# Patient Record
Sex: FEMALE | Race: WHITE | NOT HISPANIC OR LATINO | Employment: UNEMPLOYED | URBAN - NONMETROPOLITAN AREA
[De-identification: names, ages, dates, MRNs, and addresses within clinical notes are randomized per-mention and may not be internally consistent; named-entity substitution may affect disease eponyms.]

---

## 2023-03-03 PROBLEM — K80.51 BILE DUCT STONE WITH OBSTRUCT: Status: ACTIVE | Noted: 2023-03-03

## 2023-03-03 PROBLEM — D64.9 ANEMIA: Status: ACTIVE | Noted: 2023-03-03

## 2023-03-03 PROBLEM — I10 BENIGN ESSENTIAL HYPERTENSION: Status: ACTIVE | Noted: 2023-03-03

## 2023-03-03 PROBLEM — O99.810 ABNORMAL GLUCOSE AFFECTING PREGNANCY (HHS-HCC): Status: ACTIVE | Noted: 2023-03-03

## 2023-03-03 PROBLEM — M72.2 PLANTAR FASCIITIS: Status: ACTIVE | Noted: 2023-03-03

## 2023-03-03 PROBLEM — L08.9 WOUND INFECTION: Status: ACTIVE | Noted: 2023-03-03

## 2023-03-03 PROBLEM — T14.8XXA WOUND INFECTION: Status: ACTIVE | Noted: 2023-03-03

## 2023-03-03 RX ORDER — LISINOPRIL AND HYDROCHLOROTHIAZIDE 10; 12.5 MG/1; MG/1
1 TABLET ORAL DAILY
COMMUNITY
Start: 2020-08-12 | End: 2023-03-14 | Stop reason: ALTCHOICE

## 2023-03-03 RX ORDER — LEVONORGESTREL 52 MG/1
INTRAUTERINE DEVICE INTRAUTERINE
COMMUNITY
End: 2024-05-08 | Stop reason: ALTCHOICE

## 2023-03-14 ENCOUNTER — OFFICE VISIT (OUTPATIENT)
Dept: PRIMARY CARE | Facility: CLINIC | Age: 43
End: 2023-03-14
Payer: COMMERCIAL

## 2023-03-14 VITALS
OXYGEN SATURATION: 98 % | DIASTOLIC BLOOD PRESSURE: 72 MMHG | BODY MASS INDEX: 39.71 KG/M2 | SYSTOLIC BLOOD PRESSURE: 140 MMHG | WEIGHT: 253 LBS | HEIGHT: 67 IN | HEART RATE: 86 BPM

## 2023-03-14 DIAGNOSIS — I10 BENIGN ESSENTIAL HYPERTENSION: Primary | ICD-10-CM

## 2023-03-14 PROBLEM — O99.810 ABNORMAL GLUCOSE AFFECTING PREGNANCY (HHS-HCC): Status: RESOLVED | Noted: 2023-03-03 | Resolved: 2023-03-14

## 2023-03-14 PROBLEM — T14.8XXA WOUND INFECTION: Status: RESOLVED | Noted: 2023-03-03 | Resolved: 2023-03-14

## 2023-03-14 PROBLEM — D64.9 ANEMIA: Status: RESOLVED | Noted: 2023-03-03 | Resolved: 2023-03-14

## 2023-03-14 PROBLEM — M72.2 PLANTAR FASCIITIS: Status: RESOLVED | Noted: 2023-03-03 | Resolved: 2023-03-14

## 2023-03-14 PROBLEM — K80.51 BILE DUCT STONE WITH OBSTRUCT: Status: RESOLVED | Noted: 2023-03-03 | Resolved: 2023-03-14

## 2023-03-14 PROBLEM — L08.9 WOUND INFECTION: Status: RESOLVED | Noted: 2023-03-03 | Resolved: 2023-03-14

## 2023-03-14 PROCEDURE — 3078F DIAST BP <80 MM HG: CPT | Performed by: FAMILY MEDICINE

## 2023-03-14 PROCEDURE — 3077F SYST BP >= 140 MM HG: CPT | Performed by: FAMILY MEDICINE

## 2023-03-14 PROCEDURE — 99213 OFFICE O/P EST LOW 20 MIN: CPT | Performed by: FAMILY MEDICINE

## 2023-03-14 PROCEDURE — 1036F TOBACCO NON-USER: CPT | Performed by: FAMILY MEDICINE

## 2023-03-14 RX ORDER — LABETALOL 200 MG/1
200 TABLET, FILM COATED ORAL 2 TIMES DAILY
Qty: 60 TABLET | Refills: 11 | Status: SHIPPED | OUTPATIENT
Start: 2023-03-14 | End: 2024-03-27

## 2023-03-14 ASSESSMENT — PATIENT HEALTH QUESTIONNAIRE - PHQ9
SUM OF ALL RESPONSES TO PHQ9 QUESTIONS 1 AND 2: 0
1. LITTLE INTEREST OR PLEASURE IN DOING THINGS: NOT AT ALL
2. FEELING DOWN, DEPRESSED OR HOPELESS: NOT AT ALL

## 2023-03-14 ASSESSMENT — ENCOUNTER SYMPTOMS: HYPERTENSION: 1

## 2023-03-14 NOTE — PROGRESS NOTES
"Subjective   Patient ID: Kaila Richardson is a 43 y.o. female who presents for Hypertension.    Hypertension     yrly check up  Sl cough at times      Review of Systems   All other systems reviewed and are negative.  Patient is going to have IUD removed  She may become pregnant  Discussed the need to discontinue lisinopril then switch to other medication  Patient otherwise feeling well  No chest pain pressure palpitation      Objective   /72   Pulse 86   Ht 1.689 m (5' 6.5\")   Wt 115 kg (253 lb)   SpO2 98%   BMI 40.22 kg/m²     Physical Exam  General: alert, no apparent distress, good hygiene   HEAD:  Normocephalic, atraumatic    EARS:  EAC patent, TMs normal,   EYES:  sclera white, SHILA, conjunctiva noninjected  NOSE: Nasal passages patent   MOUTH: Pharynx clear, tongue uvula midline, grade 2 airway  Neck:  supple, no masses, thyroid non enlarged non nodular, no cervical adenopathy,  Lungs:  no wheezing, no rales , no rhonchi, normal respiratory pattern, breath sounds not diminished  Heart:  regular rate and  rhythm, no murmur, no ectopy, no S3 or S4, no carotid bruits  Abdomen:  soft NT,BS + ,  no organomegaly, no masses, no bruits  Extremities:  no edema, no cyanosis, no clubbing,  2+ posterior tibialis pulse    Psych:  speech fluent, normal affect, normal thought process  Skin:  no rashes, no concerning skin lesions, normal texture  Neuro:  normal gait     Assessment/Plan   Problem List Items Addressed This Visit       Benign essential hypertension - Primary   Patient will discontinue lisinopril and start on labetalol  She has check blood pressure at home she understands goal  Discussed the need to see OB early if she becomes pregnant and monitor pressure throughout pregnancy  -Diet weight loss       "

## 2023-03-14 NOTE — PATIENT INSTRUCTIONS
"DASH stands for Dietary Approaches to Stop Hypertension.   It is a balanced eating plan that your family doctor might recommend to help you lower your blood pressure. The DASH diet:Is low in salt, saturated fat, cholesterol and total fat.Emphasizes fruits, vegetables, and fat-free or low-fat milk and milk products.Includes whole grains, fish, poultry and nuts.Limits the amount of red meat, sweets, added sugars and sugar-containing beverages in your diet.Is rich in potassium, magnesium and calcium, as well as protein and fiber.Getting too much sodium (salt) in your diet can contribute to high blood pressure (also called hypertension). Some salt is in foods naturally, and some salt is added to food when it is processed or prepared. Following the DASH diet can help you lower your blood pressure, or prevent high blood pressure, by reducing the amount of sodium in your diet to less than 2,300 mg per day.The fruits, vegetables and whole grains recommended in the DASH diet provide many other elements of a healthy diet, such as lycopene, beta-carotene and isoflavones. These can help protect your body against common health conditions, such as cancer, osteoporosis, stroke and diabetes. Following the DASH diet can also help reduce your risk of heart disease by lowering your low-density lipoprotein (LDL, or \"bad\") cholesterol level.Following the DASH diet may drop your blood pressure by a few points in as little as 2 weeks. However, you should not stop taking your blood pressure medicine, or any other prescribed medicine, without talking to your doctor first.  The DASH diet  Whole grains (6 to 8 servings a day)Vegetables (4 to 5 servings a day)Fruits (4 to 5 servings a day)Low-fat or fat-free milk and milk products (2 to 3 servings a day)Lean meats, poultry and fish (6 or fewer servings a day)Nuts, seeds and beans (4 to 5 servings a week)Fats and oils (2 to 3 servings a day)Sweets, preferably low-fat or fat-free (5 or fewer a " week)Sodium (no more than 2,300 mg a day)You can adapt the DASH diet to meet your needs. For example:If you drink alcohol, limit yourself to 2 drinks or less per day for men and 1 drink or less per day for women.To reduce your blood pressure even more, replace some of the carbohydrates in the DASH diet with low-fat protein and unsaturated fats.If you need to lose weight, reduce the number of calories you eat to about 1,600 per day.Follow a lower-sodium version (no more than 1,500 mg daily) if you are 40 years of age or older, you are  or you already have high blood pressure.  How can I change my eating habits?  Don't be discouraged if following the DASH diet is difficult at first. Start with small, achievable goals. The following ideas can help you make healthy changes:Pay attention to  your current eating habits. Make a list of everything you eat for 2 or 3 days. Compare this list to the DASH diet recommendations above and see what changes you need to make in your diet.Make one change at a time. For example, start by choosing lower-fat versions of your favorite foods or adding more whole grains to your meals.Learn what makes a serving for each type of food. For example, 1 serving equals 1 slice of bread, 8 ounces of milk, 1 cup of raw vegetables or 1/2 cup of cooked vegetables. For more serving sizes, go to the [facility] site. Don't have a measuring cup? One serving (3 ounces) of meat or poultry is about the size of a deck of cards. One serving (1/2 cup) of rice or potato looks like half a baseball, and a serving of cheese is about the size of 4 stacked dice.If eating more fruits and vegetables gives you gas, bloating or diarrhea, increase these foods slowly. You can also talk to your family doctor about taking over-the-counter medicines to reduce these symptoms until your body adjusts.Get more exercise. Physical activity helps lower your blood pressure and can help you lose more weight.Use salt-free  "seasonings, such as spices and herbs, to add flavor to your recipes and reduce or eliminate salt.Include as many fresh and unprocessed foods as possible. Cut back on frozen dinners, packaged mixes, canned soups and bottled salad dressings, which are often high in sodium.When buying canned, frozen or processed foods, check nutrition labels for the amount of sodium, sugar and saturated fat. Look for the phrases \"no salt added,\" \"sodium-free,\" \"low sodium\" or \"very low sodium\" on food packages. Choose foods with monounsaturated and polyunsaturated fats.Steam, grill, poach, roast or stir-springer foods. Use low-sodium broth or water instead of butter or oil for sauting.When you eat at a restaurant, ask how foods are prepared. Ask if your order can be made without added salt. Don't add salty condiments, such as ketchup, mustard, pickles or sauces, to your food.    Copied from [email or web address] [email or web address]   "

## 2024-03-27 DIAGNOSIS — I10 BENIGN ESSENTIAL HYPERTENSION: ICD-10-CM

## 2024-03-27 RX ORDER — LABETALOL 200 MG/1
1 TABLET, FILM COATED ORAL 2 TIMES DAILY
Qty: 60 TABLET | Refills: 0 | Status: SHIPPED | OUTPATIENT
Start: 2024-03-27 | End: 2024-05-08 | Stop reason: SDUPTHER

## 2024-05-01 ENCOUNTER — APPOINTMENT (OUTPATIENT)
Dept: PRIMARY CARE | Facility: CLINIC | Age: 44
End: 2024-05-01
Payer: COMMERCIAL

## 2024-05-08 ENCOUNTER — OFFICE VISIT (OUTPATIENT)
Dept: PRIMARY CARE | Facility: CLINIC | Age: 44
End: 2024-05-08
Payer: COMMERCIAL

## 2024-05-08 VITALS
BODY MASS INDEX: 43.07 KG/M2 | DIASTOLIC BLOOD PRESSURE: 84 MMHG | HEART RATE: 72 BPM | OXYGEN SATURATION: 98 % | HEIGHT: 66 IN | SYSTOLIC BLOOD PRESSURE: 132 MMHG | WEIGHT: 268 LBS

## 2024-05-08 DIAGNOSIS — R19.7 DIARRHEA, UNSPECIFIED TYPE: ICD-10-CM

## 2024-05-08 DIAGNOSIS — I10 BENIGN ESSENTIAL HYPERTENSION: Primary | ICD-10-CM

## 2024-05-08 PROCEDURE — 3075F SYST BP GE 130 - 139MM HG: CPT

## 2024-05-08 PROCEDURE — 3079F DIAST BP 80-89 MM HG: CPT

## 2024-05-08 PROCEDURE — 1036F TOBACCO NON-USER: CPT

## 2024-05-08 PROCEDURE — 99213 OFFICE O/P EST LOW 20 MIN: CPT

## 2024-05-08 RX ORDER — LABETALOL 200 MG/1
1 TABLET, FILM COATED ORAL 2 TIMES DAILY
Qty: 180 TABLET | Refills: 3 | Status: SHIPPED | OUTPATIENT
Start: 2024-05-08

## 2024-05-08 RX ORDER — MONTELUKAST SODIUM 4 MG/1
1 TABLET, CHEWABLE ORAL
Qty: 60 TABLET | Refills: 11 | Status: SHIPPED | OUTPATIENT
Start: 2024-05-08 | End: 2025-05-08

## 2024-05-08 ASSESSMENT — PATIENT HEALTH QUESTIONNAIRE - PHQ9
2. FEELING DOWN, DEPRESSED OR HOPELESS: NOT AT ALL
1. LITTLE INTEREST OR PLEASURE IN DOING THINGS: NOT AT ALL
SUM OF ALL RESPONSES TO PHQ9 QUESTIONS 1 AND 2: 0

## 2024-05-08 NOTE — PATIENT INSTRUCTIONS
I will get back to you today about the medication for diarrhea    Call the [facility] to schedule echo for the shortness of breath

## 2024-05-08 NOTE — PROGRESS NOTES
Subjective   Patient ID: Kaila Richardson is a 44 y.o. female who presents for Hypertension (diarrhea).  HPI  Kaila presents for check up   HTN  -labetalol makes her more tired  -still taking it   -does check at home - usually sees 140s, last night it was 120s/80s  -no lightheadedness, no dizziness  -SOB at times, has noticed it during the night laying down  -Congestion is somewhat worse     Diarrhea   -Has had it for a couple years  -Had cholecystectomy   -Mostly every day  -Mostly in the morning, sometimes 3 episodes or so   -Some bloating  -No pain   -Some increase in passing gas     Health Maintenance Reminder:  -Blood Work: last lab , declined today   -Hep C: declined   -Colonoscopy: declined   -Mammo: declined  -Dexa >65y:  -Pap: , Dr. Covington   -Lung CA Screen: 50-80  -Shingrix >50:  -Pneumovax >65y, <65 if at risk, 5y between <65 and >65 dose:  -Flu: Yearly  -tdap: '    Past Surgical History:   Procedure Laterality Date    CHOLECYSTECTOMY  10/19/2015    Cholecystectomy Laparoscopic    OTHER SURGICAL HISTORY  2014    ERCP With Removal Of Stones From Biliary/Pancreatic Ducts    TYMPANOSTOMY TUBE PLACEMENT  10/09/2014    Ear Pressure Equalization Tube, Insertion, Bilaterally      Past Medical History:   Diagnosis Date    Bile duct stone with obstruct 2023    Encounter for full-term uncomplicated delivery ([facility])      (spontaneous vaginal delivery)    Gestational (pregnancy-induced) hypertension without significant proteinuria, unspecified trimester ([facility])     Gestational HTN    Other specified health status     Rubella immune    Personal history of other diseases of the circulatory system     History of cardiomyopathy    Personal history of other diseases of the female genital tract     History of polycystic ovarian syndrome    Personal history of other infectious and parasitic diseases     History of varicella     Social History     Tobacco Use    Smoking status: Former     Types:  "Cigarettes    Smokeless tobacco: Never        Review of Systems  10 point review of systems performed and is negative except as noted in the HPI.      Current Outpatient Medications:     mv-min/folic/vit K/lycop/coQ10 (DAILY MULTIVITAMIN ORAL), Take 1 tablet by mouth once daily., Disp: , Rfl:     colestipol (Colestid) 1 gram tablet, Take 1 tablet (1 g) by mouth 2 times a day after meals. Take at least 1 hour after or 4 hours before other medications., Disp: 60 tablet, Rfl: 11    labetalol (Normodyne) 200 mg tablet, Take 1 tablet (200 mg) by mouth 2 times a day., Disp: 180 tablet, Rfl: 3     Objective   /84   Pulse 72   Ht 1.676 m (5' 6\")   Wt 122 kg (268 lb)   SpO2 98%   BMI 43.26 kg/m²     Physical Exam  Constitutional:       General: She is not in acute distress.     Appearance: Normal appearance. She is not ill-appearing or toxic-appearing.   HENT:      Head: Normocephalic and atraumatic.      Right Ear: Tympanic membrane, ear canal and external ear normal.      Left Ear: Tympanic membrane, ear canal and external ear normal.      Nose: Nose normal. No congestion or rhinorrhea.      Mouth/Throat:      Mouth: Mucous membranes are moist.      Pharynx: Oropharynx is clear. No oropharyngeal exudate or posterior oropharyngeal erythema.   Eyes:      Conjunctiva/sclera: Conjunctivae normal.      Pupils: Pupils are equal, round, and reactive to light.   Neck:      Vascular: No carotid bruit.   Cardiovascular:      Rate and Rhythm: Normal rate and regular rhythm.      Pulses: Normal pulses.      Heart sounds: Normal heart sounds. No murmur heard.  Pulmonary:      Effort: Pulmonary effort is normal.      Breath sounds: Normal breath sounds. No wheezing, rhonchi or rales.   Abdominal:      General: Bowel sounds are normal. There is no distension.      Palpations: Abdomen is soft. There is no mass.      Tenderness: There is generalized abdominal tenderness. There is no guarding or rebound.   Musculoskeletal:        "  General: Normal range of motion.      Cervical back: Normal range of motion and neck supple. No tenderness.      Right lower leg: No edema.      Left lower leg: No edema.   Lymphadenopathy:      Cervical: No cervical adenopathy.   Skin:     General: Skin is warm and dry.   Neurological:      Mental Status: She is alert and oriented to person, place, and time.   Psychiatric:         Mood and Affect: Mood normal.         Behavior: Behavior normal.         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 Patient (and/or caretaker of patient if present) stated all questions were answered, and they voiced understanding of instructions.

## 2024-05-09 PROBLEM — Z86.79 HISTORY OF CARDIOMYOPATHY: Status: ACTIVE | Noted: 2024-05-09

## 2025-05-07 ENCOUNTER — APPOINTMENT (OUTPATIENT)
Dept: PRIMARY CARE | Facility: CLINIC | Age: 45
End: 2025-05-07
Payer: COMMERCIAL

## 2025-05-07 VITALS
SYSTOLIC BLOOD PRESSURE: 130 MMHG | WEIGHT: 263 LBS | OXYGEN SATURATION: 100 % | HEART RATE: 76 BPM | DIASTOLIC BLOOD PRESSURE: 82 MMHG | BODY MASS INDEX: 43.82 KG/M2 | HEIGHT: 65 IN

## 2025-05-07 DIAGNOSIS — I10 BENIGN ESSENTIAL HYPERTENSION: Primary | ICD-10-CM

## 2025-05-07 PROCEDURE — 1036F TOBACCO NON-USER: CPT

## 2025-05-07 PROCEDURE — 3075F SYST BP GE 130 - 139MM HG: CPT

## 2025-05-07 PROCEDURE — 3008F BODY MASS INDEX DOCD: CPT

## 2025-05-07 PROCEDURE — 99213 OFFICE O/P EST LOW 20 MIN: CPT

## 2025-05-07 PROCEDURE — 3079F DIAST BP 80-89 MM HG: CPT

## 2025-05-07 RX ORDER — LISINOPRIL AND HYDROCHLOROTHIAZIDE 10; 12.5 MG/1; MG/1
1 TABLET ORAL DAILY
Qty: 90 TABLET | Refills: 3 | Status: SHIPPED | OUTPATIENT
Start: 2025-05-07 | End: 2026-05-02

## 2025-05-07 NOTE — PATIENT INSTRUCTIONS
Start lisinopril hydrochlorothiazide once a day for blood pressure   Stop labetalol  1 month follow up in person or phone     [facility] 's   (Breast Cancer and Cervical Cancer Prevention )  phone number  is [phone].     This is a program who will cover the costs of breast and pelvic exams, pap smears, and mammograms, as well as the follow up to any abnormalities found with those tests.      They  help women who are uninsured or under insured.     If you haven't yet,  please give them a call to see if you qualify for the program.    If you do,  they will have paperwork for you to fill out and send back.     If you have qualified for the program,  they will have to set up all appointments for you.     If you are [place], the [facility] ( an [facility] center) sponsors periodic clinics with [facility].  You can ask the [facility] people if you could have an appointment there  OR  in the office with me.       If you are non-[place], your visit will only be here in the office.      If you already have your order for a mammogram,  be sure to contact them to set up the mammogram appointment.     Be sure to take your mammogram order with you to your appointment, along with any [facility] paperwork you may have.     If you have already had your testing, you will always hear about your results.  So if 3 weeks go by, and you have not heard anything, please let either myself or the people at [facility] know.     And,  please tell any of your friends who may not have insurance about this fantastic program!

## 2025-05-07 NOTE — ASSESSMENT & PLAN NOTE
Stop labetalol, she has been on this due to concern of possible pregnancy, she states at this point she is not planning pregnancy  She would like to go back on lisinopril hydrochlorothiazide, will restart   Discussed 1 month follow up to see how BP is, check BP   Discussed if she were to become pregnant then she needs to stop lisinopril hydrochlorothiazide immediately, discussed the risks   Orders:    lisinopriL-hydrochlorothiazide 10-12.5 mg tablet; Take 1 tablet by mouth once daily.

## 2025-05-07 NOTE — PROGRESS NOTES
"Subjective   Patient ID: Kaila Richardson is a 45 y.o. female who presents for Hypertension.  HPI  Kaila presents for check up   Last visit was 5/8/24    HTN   -still tired on labetalol  -it is controlling BP  -states she is not planning to be pregnant, that is why we have had her on labetalol due to concern of pregnancy    Diarrhea   -colestipol helped   -stopped it due to not wanting to take another medication  -diarrhea is not bloody or different   -diarrhea daily     She is a carrier for cardiomyopathy gene - has had echo before she thinks 2008, has not followed up with [facility]    Sleep- is good     Health Maintenance Reminder:  -Blood Work: last lab 2018, declined today due to cost   -Hep C: declined   -Colonoscopy: declined   -Mammo: [facility] info   -Dexa >65y:  -Pap: 2023 wnl neg co-testing due again 2028, Dr. Covington   -Lung CA Screen: 50-80  -Shingrix >50:  -Pneumovax >65y, <65 if at risk, 5y between <65 and >65 dose:  -Flu: Yearly  -tdap:     Surgical History[1]   Medical History[2]  Social History[3]     Review of Systems  10 point review of systems performed and is negative except as noted in the HPI.    Current Medications[4]     Objective   /82   Pulse 76   Ht 1.657 m (5' 5.25\")   Wt 119 kg (263 lb)   SpO2 100%   BMI 43.43 kg/m²     Physical Exam  Constitutional:       Appearance: Normal appearance.   HENT:      Head: Normocephalic and atraumatic.      Right Ear: Tympanic membrane, ear canal and external ear normal.      Left Ear: Tympanic membrane, ear canal and external ear normal.      Nose: Nose normal.      Mouth/Throat:      Mouth: Mucous membranes are moist.      Pharynx: Oropharynx is clear.   Eyes:      Conjunctiva/sclera: Conjunctivae normal.      Pupils: Pupils are equal, round, and reactive to light.   Cardiovascular:      Rate and Rhythm: Normal rate and regular rhythm.      Pulses: Normal pulses.      Heart sounds: Normal heart sounds. No murmur heard.  Pulmonary:      Effort: Pulmonary " effort is normal.      Breath sounds: Normal breath sounds. No wheezing, rhonchi or rales.   Abdominal:      General: Bowel sounds are normal.      Palpations: Abdomen is soft.      Tenderness: There is no abdominal tenderness.   Musculoskeletal:         General: Normal range of motion.      Right lower leg: No edema.      Left lower leg: No edema.   Skin:     General: Skin is warm and dry.   Neurological:      Mental Status: She is alert and oriented to person, place, and time.   Psychiatric:         Mood and Affect: Mood normal.         Behavior: Behavior normal.         Assessment & Plan  Benign essential hypertension  Stop labetalol, she has been on this due to concern of possible pregnancy, she states at this point she is not planning pregnancy  She would like to go back on lisinopril hydrochlorothiazide, will restart   Discussed 1 month follow up to see how BP is, check BP   Discussed if she were to become pregnant then she needs to stop lisinopril hydrochlorothiazide immediately, discussed the risks   Orders:    lisinopriL-hydrochlorothiazide 10-12.5 mg tablet; Take 1 tablet by mouth once daily.          Discussed at visit any disease processes that were of concern as well as the risks, benefits and instructions on any new medication provided. Patient (and/or caretaker of patient if present) stated all questions were answered, and they voiced understanding of instructions.      Lilli Patel PA-C       [1]   Past Surgical History:  Procedure Laterality Date    CHOLECYSTECTOMY  10/19/2015    Cholecystectomy Laparoscopic    OTHER SURGICAL HISTORY  2014    ERCP With Removal Of Stones From Biliary/Pancreatic Ducts    TYMPANOSTOMY TUBE PLACEMENT  10/09/2014    Ear Pressure Equalization Tube, Insertion, Bilaterally   [2]   Past Medical History:  Diagnosis Date    Bile duct stone with obstruct 2023    Encounter for full-term uncomplicated delivery      (spontaneous vaginal delivery)    Gestational  (pregnancy-induced) hypertension without significant proteinuria, unspecified trimester ([facility])     Gestational HTN    Other specified health status     Rubella immune    Personal history of other diseases of the circulatory system     History of cardiomyopathy    Personal history of other diseases of the female genital tract     History of polycystic ovarian syndrome    Personal history of other infectious and parasitic diseases     History of varicella   [3]   Social History  Tobacco Use    Smoking status: Former     Types: Cigarettes    Smokeless tobacco: Never   Vaping Use    Vaping status: Never Used   Substance Use Topics    Alcohol use: Never    Drug use: Never   [4]   Current Outpatient Medications:     colestipol (Colestid) 1 gram tablet, Take 1 tablet (1 g) by mouth 2 times a day after meals. Take at least 1 hour after or 4 hours before other medications. (Patient not taking: Reported on 5/7/2025), Disp: 60 tablet, Rfl: 11    lisinopriL-hydrochlorothiazide 10-12.5 mg tablet, Take 1 tablet by mouth once daily., Disp: 90 tablet, Rfl: 3    mv-min/folic/vit K/lycop/coQ10 (DAILY MULTIVITAMIN ORAL), Take 1 tablet by mouth once daily. (Patient not taking: Reported on 5/7/2025), Disp: , Rfl:

## 2025-06-04 ENCOUNTER — APPOINTMENT (OUTPATIENT)
Dept: PRIMARY CARE | Facility: CLINIC | Age: 45
End: 2025-06-04
Payer: COMMERCIAL